# Patient Record
Sex: FEMALE | Race: WHITE
[De-identification: names, ages, dates, MRNs, and addresses within clinical notes are randomized per-mention and may not be internally consistent; named-entity substitution may affect disease eponyms.]

---

## 2020-12-09 ENCOUNTER — HOSPITAL ENCOUNTER (EMERGENCY)
Dept: HOSPITAL 41 - JD.ED | Age: 50
Discharge: HOME | End: 2020-12-09
Payer: COMMERCIAL

## 2020-12-09 DIAGNOSIS — R10.12: Primary | ICD-10-CM

## 2020-12-09 DIAGNOSIS — F17.210: ICD-10-CM

## 2020-12-09 DIAGNOSIS — Z79.899: ICD-10-CM

## 2020-12-09 DIAGNOSIS — Z20.828: ICD-10-CM

## 2020-12-09 DIAGNOSIS — F32.9: ICD-10-CM

## 2020-12-09 DIAGNOSIS — E03.9: ICD-10-CM

## 2020-12-09 LAB — SARS-COV-2 RNA RESP QL NAA+PROBE: NEGATIVE

## 2020-12-09 NOTE — CR
Chest: Portable view of the chest was obtained.

 

Comparison: No prior chest imaging.

 

Findings:

Heart size and mediastinum: Heart size and mediastinum are within 

normal limits for portable technique.  No mediastinal mass is seen.

 

Lungs: Lungs are clear with no acute parenchymal change.  No pleural 

effusions are seen.

 

Osseous: No discrete osseous abnormality is appreciated.

 

Impression:

1.  Nothing acute is seen on portable chest x-ray.

 

Diagnostic code #1

## 2020-12-09 NOTE — CT
CT abdomen and pelvis

 

Technique: Multiple axial sections were obtained from above the dome 

of the diaphragm inferiorly to the pubic symphysis.  Intravenous and 

oral contrast was not utilized.  Study has been performed as a 

ureteral stone protocol.  Reconstructed coronal and sagittal images 

were obtained.

 

Comparison: No prior CT abdomen or pelvis study is available.

 

Findings:

Renal: Both kidneys show no abnormal calcifications.  No 

hydronephrosis is appreciated.  No ureteral dilatation or ureteral 

stone is seen.  No bladder calculi are seen.

 

Lung bases: Visualized lung bases show nothing acute.

 

Liver and spleen: Liver and spleen show no focal abnormality.  

Gallbladder contains no calcified gallstones.

 

Adrenal glands: Adrenal glands show no nodule.  Pancreas shows no 

abnormality.

 

Aorta, retroperitoneum and mesentery: Aorta shows no aneurysm.  No 

retroperitoneal adenopathy is seen.  Small fat-containing umbilical 

hernia is noted.  No additional mesenteric abnormalities are seen.

 

Pelvis: Diverticuli are seen within the descending and sigmoid colon. 

 No inflammatory change is seen to indicate diverticulitis.

 

Bowel: Appendix is seen which is normal in size.  No bowel dilatation 

or discrete bowel wall thickening is appreciated.

 

Osseous: No acute osseous findings are appreciated.

 

Impression:

1.  Colonic diverticuli without diverticulitis.

2.  No renal calculi, renal hydronephrosis or renal calculi are seen.

3.  Nothing acute is appreciated on noncontrast CT study of the 

abdomen and pelvis.

 

Diagnostic code #2

## 2020-12-09 NOTE — EDM.PDOC
ED HPI GENERAL MEDICAL PROBLEM





- General


Chief Complaint: Flank Pain


Stated Complaint: LT SIDE ARM AND FLANK PAIN


Time Seen by Provider: 12/09/20 10:15


Source of Information: Reports: Patient


History Limitations: Reports: No Limitations





- History of Present Illness


INITIAL COMMENTS - FREE TEXT/NARRATIVE: 





49-year-old female presents to the ER with complaints of left flank pain that 

started 2 days ago, she states that she became dizzy today associated with it 

and decided to come to the emergency department.  She denies nausea vomiting or 

diarrhea.  States she has developed a cough and chills over the past couple of 

days as well.  Pain is located in the left flank, she says it is intermittent 

and it does get worse with taking a deep breath although in the shower this 

morning she was standing and said it was a stabbing pain and she had to brace 

herself so that she did not fall.  She also states that the pain is now 

radiating around the front of her abdomen under her left ribs.  She states that 

laying in bed makes it feel better.  She also reports that over the last couple 

of the days she has noticed both of her arms tingling at nighttime and increased

weakness she initially thought it was when she had sleep with her arms elevated 

above her head however she states that there is no association with this.  Also 

states over the course of the past 2 days she has been sleeping significantly 

more and has no energy.  She is not aware of any known exposure to Covid and 

states she has been good about wearing a mask and face shield when at work.  

History of elevated cholesterol for which she does not take medication otherwise

states medical history is unremarkable.  Her primary care physician is Kelly Hernandez at [facility].


Onset: Gradual


  ** Left Flank


Pain Score (Numeric/FACES): 3





- Related Data


                                    Allergies











Allergy/AdvReac Type Severity Reaction Status Date / Time


 


No Known Allergies Allergy   Verified 12/09/20 10:07











Home Meds: 


                                    Home Meds





Levothyroxine [Synthroid] 50 mcg PO ACBREAKFAST 12/09/20 [History]


Sertraline [Zoloft] 50 mg PO DAILY 12/09/20 [History]











Past Medical History





- Past Health History


Medical/Surgical History: Denies Medical/Surgical History


HEENT History: Reports: Glaucoma


Gastrointestinal History: Reports: GERD


Musculoskeletal History: Reports: Other (See Below)


Other Musculoskeletal History: colar bone


Psychiatric History: Reports: Depression


Endocrine/Metabolic History: Reports: Hypothyroidism





- Past Surgical History


Female  Surgical History: Reports: Hysterectomy





Social & Family History





- Tobacco Use


Tobacco Use Status *Q: Current Some Day Tobacco User


Years of Tobacco use: 20


Packs/Tins Daily: 0





- Caffeine Use


Caffeine Use: Reports: Coffee





- Recreational Drug Use


Recreational Drug Use: No





ED ROS GENERAL





- Review of Systems


Review Of Systems: See Below


Constitutional: Reports: Chills, Malaise, Weakness


HEENT: Denies: Throat Pain


Respiratory: Reports: Shortness of Breath, Cough.  Denies: Pleuritic Chest Pain,

 Sputum


Cardiovascular: Reports: Dyspnea on Exertion, Lightheadedness.  Denies: Chest 

Pain, Blood Pressure Problem, Edema, Palpitations, Syncope


Endocrine: Reports: No Symptoms


GI/Abdominal: Reports: Abdominal Pain (Radiating from left flank to left upper 

abdomen beneath rib cage).  Denies: Black Stool, Constipation, Diarrhea, Nausea,

 Vomiting


: Reports: Flank Pain (Left).  Denies: Dysuria, Frequency, Hematuria, 

Incontinence, Urgency, Urinary Retention


Musculoskeletal: Reports: Shoulder Pain (Bilateral)


Skin: Reports: No Symptoms


Neurological: Reports: Headache, Numbness (Bilateral shoulders)


Psychiatric: Reports: No Symptoms


Hematologic/Lymphatic: Reports: No Symptoms


Immunologic: Reports: No Symptoms





ED EXAM, GENERAL





- Physical Exam


Exam: See Below


Exam Limited By: No Limitations


General Appearance: Alert, WD/WN, No Apparent Distress


Eye Exam: Bilateral Eye: EOMI, PERRL


Head: Atraumatic, Normocephalic


Neck: Normal Inspection, Supple, Non-Tender, Full Range of Motion


Respiratory/Chest: No Respiratory Distress, Lungs Clear, Normal Breath Sounds, 

No Accessory Muscle Use, Chest Non-Tender


Cardiovascular: Normal Peripheral Pulses, Regular Rate, Rhythm, No Edema, No 

Murmur


Peripheral Pulses: 2+: Radial (L), Radial (R), Dorsalis Pedis (L), Dorsalis 

Pedis (R)


GI/Abdominal: Normal Bowel Sounds, Soft, Tender (Left upper quadrant tender with

 palpation)


 (Female) Exam: Deferred


Rectal (Female) Exam: Deferred


Back Exam: Normal Inspection, Full Range of Motion


Extremities: Normal Inspection, Normal Range of Motion, Non-Tender, No Pedal 

Edema


Neurological: Alert, Oriented, Normal Cognition


Psychiatric: Normal Affect, Normal Mood


Skin Exam: Warm, Dry, Intact


Lymphatic: No Adenopathy


  ** #1 Interpretation


EKG Date: 12/09/20


Time: 11:02


Rhythm: NSR


Rate (Beats/Min): 67


Axis: Normal


P-Wave: Present


QRS: Normal


ST-T: Normal


QT: Normal


Comparison: NA - No Prior EKG





Course





- Vital Signs


Last Recorded V/S: 


                                Last Vital Signs











Temp  98.3 F   12/09/20 09:59


 


Pulse  77   12/09/20 09:59


 


Resp  16   12/09/20 09:59


 


BP  137/57 L  12/09/20 09:59


 


Pulse Ox  96   12/09/20 09:59














- Orders/Labs/Meds


Orders: 


                               Active Orders 24 hr











 Category Date Time Status


 


 EKG Documentation Completion [RC] STAT Care  12/09/20 10:25 Active











Labs: 


                                Laboratory Tests











  12/09/20 12/09/20 12/09/20 Range/Units





  10:43 10:43 11:05 


 


WBC  8.43    (3.98-10.04)  K/mm3


 


RBC  4.51    (3.98-5.22)  M/mm3


 


Hgb  13.9    (11.2-15.7)  gm/dl


 


Hct  41.9    (34.1-44.9)  %


 


MCV  92.9    (79.4-94.8)  fl


 


MCH  30.8    (25.6-32.2)  pg


 


MCHC  33.2    (32.2-35.5)  g/dl


 


RDW Std Deviation  43.9    (36.4-46.3)  fL


 


Plt Count  332    (182-369)  K/mm3


 


MPV  9.7    (9.4-12.3)  fl


 


Neut % (Auto)  73.1 H    (34.0-71.1)  %


 


Lymph % (Auto)  16.8 L    (19.3-51.7)  %


 


Mono % (Auto)  7.8    (4.7-12.5)  %


 


Eos % (Auto)  2.0    (0.7-5.8)  


 


Baso % (Auto)  0.2    (0.1-1.2)  %


 


Neut # (Auto)  6.15 H    (1.56-6.13)  K/mm3


 


Lymph # (Auto)  1.42    (1.18-3.74)  K/mm3


 


Mono # (Auto)  0.66 H    (0.24-0.36)  K/mm3


 


Eos # (Auto)  0.17    (0.04-0.36)  K/mm3


 


Baso # (Auto)  0.02    (0.01-0.08)  K/mm3


 


Sodium   132 L   (136-145)  mEq/L


 


Potassium   4.0   (3.5-5.1)  mEq/L


 


Chloride   98   ()  mEq/L


 


Carbon Dioxide   26   (21-32)  mEq/L


 


Anion Gap   12.0   (5-15)  


 


BUN   14   (7-18)  mg/dL


 


Creatinine   0.7   (0.55-1.02)  mg/dL


 


Est Cr Clr Drug Dosing   72.55   mL/min


 


Estimated GFR (MDRD)   > 60   (>60)  mL/min


 


BUN/Creatinine Ratio   20.0 H   (14-18)  


 


Glucose   92   ()  mg/dL


 


Calcium   9.1   (8.5-10.1)  mg/dL


 


Total Bilirubin   0.5   (0.2-1.0)  mg/dL


 


AST   15   (15-37)  U/L


 


ALT   28   (14-59)  U/L


 


Alkaline Phosphatase   77   ()  U/L


 


Troponin I   < 0.017   (0.00-0.056)  ng/mL


 


Total Protein   7.5   (6.4-8.2)  g/dl


 


Albumin   3.7   (3.4-5.0)  g/dl


 


Globulin   3.8   gm/dL


 


Albumin/Globulin Ratio   1.0   (1-2)  


 


Urine Color     (Yellow)  


 


Urine Appearance     (Clear)  


 


Urine pH     (5.0-8.0)  


 


Ur Specific Gravity     (1.005-1.030)  


 


Urine Protein     (Negative)  


 


Urine Glucose (UA)     (Negative)  


 


Urine Ketones     (Negative)  


 


Urine Occult Blood     (Negative)  


 


Urine Nitrite     (Negative)  


 


Urine Bilirubin     (Negative)  


 


Urine Urobilinogen     (0.2-1.0)  


 


Ur Leukocyte Esterase     (Negative)  


 


Urine RBC     (0-5)  /hpf


 


Urine WBC     (0-5)  /hpf


 


Ur Epithelial Cells     (0-5)  /hpf


 


Urine Bacteria     (FEW)  /hpf


 


Urine Mucus     (FEW)  /hpf


 


Influenza Type A RNA    Negative  (NEGATIVE)  


 


Influenza Type B RNA    Negative  (NEGATIVE)  


 


SARS-CoV-2 RNA ([place])    Negative  (NEGATIVE)  














  12/09/20 Range/Units





  11:20 


 


WBC   (3.98-10.04)  K/mm3


 


RBC   (3.98-5.22)  M/mm3


 


Hgb   (11.2-15.7)  gm/dl


 


Hct   (34.1-44.9)  %


 


MCV   (79.4-94.8)  fl


 


MCH   (25.6-32.2)  pg


 


MCHC   (32.2-35.5)  g/dl


 


RDW Std Deviation   (36.4-46.3)  fL


 


Plt Count   (182-369)  K/mm3


 


MPV   (9.4-12.3)  fl


 


Neut % (Auto)   (34.0-71.1)  %


 


Lymph % (Auto)   (19.3-51.7)  %


 


Mono % (Auto)   (4.7-12.5)  %


 


Eos % (Auto)   (0.7-5.8)  


 


Baso % (Auto)   (0.1-1.2)  %


 


Neut # (Auto)   (1.56-6.13)  K/mm3


 


Lymph # (Auto)   (1.18-3.74)  K/mm3


 


Mono # (Auto)   (0.24-0.36)  K/mm3


 


Eos # (Auto)   (0.04-0.36)  K/mm3


 


Baso # (Auto)   (0.01-0.08)  K/mm3


 


Sodium   (136-145)  mEq/L


 


Potassium   (3.5-5.1)  mEq/L


 


Chloride   ()  mEq/L


 


Carbon Dioxide   (21-32)  mEq/L


 


Anion Gap   (5-15)  


 


BUN   (7-18)  mg/dL


 


Creatinine   (0.55-1.02)  mg/dL


 


Est Cr Clr Drug Dosing   mL/min


 


Estimated GFR (MDRD)   (>60)  mL/min


 


BUN/Creatinine Ratio   (14-18)  


 


Glucose   ()  mg/dL


 


Calcium   (8.5-10.1)  mg/dL


 


Total Bilirubin   (0.2-1.0)  mg/dL


 


AST   (15-37)  U/L


 


ALT   (14-59)  U/L


 


Alkaline Phosphatase   ()  U/L


 


Troponin I   (0.00-0.056)  ng/mL


 


Total Protein   (6.4-8.2)  g/dl


 


Albumin   (3.4-5.0)  g/dl


 


Globulin   gm/dL


 


Albumin/Globulin Ratio   (1-2)  


 


Urine Color  Yellow  (Yellow)  


 


Urine Appearance  Clear  (Clear)  


 


Urine pH  5.5  (5.0-8.0)  


 


Ur Specific Gravity  > or = 1.030  (1.005-1.030)  


 


Urine Protein  Trace H  (Negative)  


 


Urine Glucose (UA)  Negative  (Negative)  


 


Urine Ketones  Negative  (Negative)  


 


Urine Occult Blood  1+ H  (Negative)  


 


Urine Nitrite  Negative  (Negative)  


 


Urine Bilirubin  Negative  (Negative)  


 


Urine Urobilinogen  0.2  (0.2-1.0)  


 


Ur Leukocyte Esterase  Negative  (Negative)  


 


Urine RBC  0-5  (0-5)  /hpf


 


Urine WBC  5-10 H  (0-5)  /hpf


 


Ur Epithelial Cells  10-20 H  (0-5)  /hpf


 


Urine Bacteria  Few  (FEW)  /hpf


 


Urine Mucus  Many H  (FEW)  /hpf


 


Influenza Type A RNA   (NEGATIVE)  


 


Influenza Type B RNA   (NEGATIVE)  


 


SARS-CoV-2 RNA ([place])   (NEGATIVE)  














- Re-Assessments/Exams


Free Text/Narrative Re-Assessment/Exam: 





12/09/20 12:18


CBC and CMP are unremarkable.  Troponin is less than 0.017.  UA shows trace 

urine protein, 1+ occult blood, nitrite negative, leuk esterase negative, urine 

WBC 5-10,000, urine epithelial cells 10-20, urine mucus many, influenza A B and 

Covid are all negative.  Portable chest x-ray shows nothing acute.  I have 

ordered a CT of the abdomen without contrast to rule out kidney stone.


12/09/20 13:14


CT of abdomen and pelvis impression per the radiologist report: 1.  Colonic 

diverticuli without diverticulitis. 2.  No renal calculi, renal hydronephrosis 

or renal calculi are seen. 3.  Nothing acute is appreciated on noncontrast CT 

study of the abdomen and pelvis.  Will discharge the patient to home to follow-

up with Kelly Hernandez as she already has an appointment scheduled next week.





Departure





- Departure


Time of Disposition: 13:16


Disposition: Home, Self-Care 01


Clinical Impression: 


 Flank pain, acute








- Discharge Information


Instructions:  Flank Pain, Adult, Easy-to-Read


Referrals: 


Kelly Hernandez NP [Primary Care Provider] - 


Forms:  ED Department Discharge


Additional Instructions: 


You are seen in the emergency department today for flank pain and bilateral 

shoulder numbness.  An EKG was performed as well as a chest x-ray and cardiac 

work-up.  These were all unremarkable for cardiac origin.  All labs were 

unremarkable for infection or abnormalities.  A CT scan of the abdomen and 

pelvis were performed and you do not have a kidney stone.  Diverticuli was seen 

on CT scan without diverticulitis which is indicative of inflammation/infection.

 Urinalysis was unremarkable for infection.  Influenza A, B, and Covid tests 

were all negative as well.  Recommend you follow-up with your regular provider 

as scheduled.  Should your condition worsen or change please return to the 

emergency department.





Sepsis Event Note (ED)





- Evaluation


Sepsis Screening Result: No Definite Risk





- Focused Exam


Vital Signs: 


                                   Vital Signs











  Temp Pulse Resp BP Pulse Ox


 


 12/09/20 09:59  98.3 F  77  16  137/57 L  96














- My Orders


Last 24 Hours: 


My Active Orders





12/09/20 10:25


EKG Documentation Completion [RC] STAT 














- Assessment/Plan


Last 24 Hours: 


My Active Orders





12/09/20 10:25


EKG Documentation Completion [RC] STAT